# Patient Record
Sex: MALE | Race: WHITE | NOT HISPANIC OR LATINO | Employment: FULL TIME | URBAN - METROPOLITAN AREA
[De-identification: names, ages, dates, MRNs, and addresses within clinical notes are randomized per-mention and may not be internally consistent; named-entity substitution may affect disease eponyms.]

---

## 2020-10-21 ENCOUNTER — HOSPITAL ENCOUNTER (OUTPATIENT)
Dept: GENERAL RADIOLOGY | Facility: HOSPITAL | Age: 25
Discharge: HOME | End: 2020-10-21

## 2020-10-21 ENCOUNTER — OFFICE VISIT (OUTPATIENT)
Dept: FAMILY MEDICINE CLINIC | Facility: CLINIC | Age: 25
End: 2020-10-21

## 2020-10-21 VITALS
TEMPERATURE: 98.1 F | RESPIRATION RATE: 18 BRPM | BODY MASS INDEX: 35.89 KG/M2 | WEIGHT: 265 LBS | SYSTOLIC BLOOD PRESSURE: 133 MMHG | OXYGEN SATURATION: 98 % | HEIGHT: 72 IN | HEART RATE: 79 BPM | DIASTOLIC BLOOD PRESSURE: 78 MMHG

## 2020-10-21 DIAGNOSIS — M54.2 NECK PAIN: ICD-10-CM

## 2020-10-21 DIAGNOSIS — Z00.00 ANNUAL PHYSICAL EXAM: Primary | ICD-10-CM

## 2020-10-21 DIAGNOSIS — R05.9 COUGH: ICD-10-CM

## 2020-10-21 PROCEDURE — 99204 OFFICE O/P NEW MOD 45 MIN: CPT | Performed by: FAMILY MEDICINE

## 2020-10-21 PROCEDURE — 71046 X-RAY EXAM CHEST 2 VIEWS: CPT

## 2020-10-21 PROCEDURE — 72050 X-RAY EXAM NECK SPINE 4/5VWS: CPT

## 2020-10-21 PROCEDURE — 99385 PREV VISIT NEW AGE 18-39: CPT | Performed by: FAMILY MEDICINE

## 2020-10-21 NOTE — PROGRESS NOTES
Chief Complaint   Patient presents with   • Establish Care     needs PCP   • Shortness of Breath     right sided chest pain       History of Present Illness:  Subjective   Dagoberto Caban is a 25 y.o. male.   History of Present Illness   Dagoberto Caban is a 25 y.o. male here for his annual physical with me. Dagoberto is here for coordination of medical care, to discuss health maintenance, disease prevention as well as to followup on medical problems. Patient has been followed by me since today. Patient's last CPE was unknown. Activity level is moderate. Exercises 2 per week. Appetite is good. Feels well with few complaints. Energy level is good. Sleeps well. Patient is doing routine self skin exam occasionally.   Patient has a history of lumbar spinal surgery in 2016. He underwent an L5-S1 decompression and L5-S1 left micro discectomy. He reports he has difficulty getting comfortable going to sleep. He is aware he has another level of a bulging disk- he believes it is at L4-5.     Neck Pain:   Patient reports that he has had midline-right sided neck pain since this past summer. He also reports that he has pain over the dermatome of C4-5, C5-6. (He is aware of this due to his past history of lumbar spinal surgery). He recalls a time over the summer he was outside playing around and felt a pop in his neck and has had pain ever since.     Breathing Difficulty:   Patient reports he had bronchitis in December 2019 and reports that at times he still becomes short of breath. With having a new born baby, this concerns him and he would like to have a chest x ray to ensure it isn't anything serious.     Allergies:  Allergies   Allergen Reactions   • Penicillins Rash       Social History:  Social History     Socioeconomic History   • Marital status:      Spouse name: Not on file   • Number of children: Not on file   • Years of education: Not on file   • Highest education level: Not on file       Family History:  History reviewed.  "No pertinent family history.    Past Medical History :  Active Ambulatory Problems     Diagnosis Date Noted   • Cough 10/21/2020   • Neck pain 10/21/2020   • Annual physical exam 10/22/2020     Resolved Ambulatory Problems     Diagnosis Date Noted   • No Resolved Ambulatory Problems     No Additional Past Medical History       Medication List:  No outpatient encounter medications on file as of 10/21/2020.     No facility-administered encounter medications on file as of 10/21/2020.        Past Surgical History:  History reviewed. No pertinent surgical history.     The following portions of the patient's history were reviewed and updated as appropriate: allergies, current medications, past family history, past medical history, past social history, past surgical history and problem list.    Review Of Systems:  Review of Systems   Constitutional: Negative for activity change, appetite change and fatigue.   HENT: Negative for congestion, postnasal drip, sinus pressure and sore throat.    Eyes: Negative for blurred vision and itching.   Respiratory: Positive for cough. Negative for shortness of breath and wheezing.    Cardiovascular: Negative for chest pain.   Gastrointestinal: Negative for abdominal pain, constipation, nausea, vomiting and GERD.   Endocrine: Negative for cold intolerance and heat intolerance.   Genitourinary: Negative for difficulty urinating, dysuria and urinary incontinence.   Musculoskeletal: Positive for neck pain. Negative for back pain and joint swelling.   Skin: Negative for color change and rash.   Neurological: Negative for dizziness, speech difficulty, weakness and memory problem.   Psychiatric/Behavioral: Negative for behavioral problems, decreased concentration, suicidal ideas and depressed mood.       Objective     Physical Exam:  Vital Signs:  Visit Vitals  /78   Pulse 79   Temp 98.1 °F (36.7 °C)   Resp 18   Ht 182.9 cm (72\")   Wt 120 kg (265 lb)   SpO2 98%   BMI 35.94 kg/m² "       Physical Exam  Vitals signs reviewed.   Constitutional:       Appearance: He is well-developed.   HENT:      Head: Normocephalic and atraumatic.      Nose: Nose normal.   Eyes:      General: Lids are normal.      Conjunctiva/sclera: Conjunctivae normal.      Pupils: Pupils are equal, round, and reactive to light.   Neck:      Musculoskeletal: Normal range of motion and neck supple.   Cardiovascular:      Rate and Rhythm: Normal rate and regular rhythm.      Pulses: Normal pulses.      Heart sounds: Normal heart sounds.   Pulmonary:      Effort: Pulmonary effort is normal.      Breath sounds: Normal breath sounds.   Abdominal:      General: Bowel sounds are normal.      Palpations: Abdomen is soft.   Musculoskeletal: Normal range of motion.      Cervical back: He exhibits tenderness.   Skin:     General: Skin is warm and dry.      Capillary Refill: Capillary refill takes less than 2 seconds.   Neurological:      Mental Status: He is alert and oriented to person, place, and time.   Psychiatric:         Behavior: Behavior normal.         Thought Content: Thought content normal.         Judgment: Judgment normal.           Assessment/Plan   Assessment and Plan:  Diagnoses and all orders for this visit:    1. Annual physical exam (Primary)  Assessment & Plan:  Discussed injury prevention, diet and exercise, safe sexual practices, and screening for common diseases. Encouraged use of sunscreen and seatbelts. Avoidance of tobacco encouraged. Limitation or avoidance of alcohol encouraged. Recommend yearly dental and eye exams. Also discussed monitoring of blood pressure, lipids.      2. Neck pain  Assessment & Plan:  X-ray of the neck was ordered to determine if it was causing pain.  Consider physical therapy and possible MRI for further evaluation and treatment    Orders:  -     XR Spine Cervical Complete 4 or 5 View; Future    3. Cough  Assessment & Plan:  Due to his symptoms of cough x-ray was ordered and reviewed  by me.  Patient is on multiple Covid test which are all negative.  Patient was recently exposed to Covid positive patient would like to be retested.    Orders:  -     XR Chest PA & Lateral  -     COVID-19,[facility] ROUTINE, NP/OP SWAB IN TRANSPORT MEDIA OR ESWAB 72 HR TAT - Swab, Anterior nasal

## 2020-10-22 PROBLEM — Z00.00 ANNUAL PHYSICAL EXAM: Status: ACTIVE | Noted: 2020-10-22

## 2020-10-22 LAB — SARS-COV-2 RNA RESP QL NAA+PROBE: NOT DETECTED

## 2020-10-22 NOTE — ASSESSMENT & PLAN NOTE
X-ray of the neck was ordered to determine if it was causing pain.  Consider physical therapy and possible MRI for further evaluation and treatment

## 2020-10-22 NOTE — ASSESSMENT & PLAN NOTE
Due to his symptoms of cough x-ray was ordered and reviewed by me.  Patient is on multiple Covid test which are all negative.  Patient was recently exposed to Covid positive patient would like to be retested.

## 2020-10-26 ENCOUNTER — TELEPHONE (OUTPATIENT)
Dept: FAMILY MEDICINE CLINIC | Facility: CLINIC | Age: 25
End: 2020-10-26

## 2020-10-26 NOTE — TELEPHONE ENCOUNTER
Patient called and wanted to know the results of his COVID, spine and chest xray.     COVID :  was negative  Chest xray : was normal study impression and findings read     Cervical spine : Normal study, impression and findings was read to the patient

## 2021-09-10 PROBLEM — Z20.822 SUSPECTED COVID-19 VIRUS INFECTION: Status: ACTIVE | Noted: 2021-09-10

## 2021-09-10 PROCEDURE — U0004 COV-19 TEST NON-CDC HGH THRU: HCPCS | Performed by: NURSE PRACTITIONER
